# Patient Record
Sex: MALE | Race: BLACK OR AFRICAN AMERICAN | NOT HISPANIC OR LATINO | Employment: OTHER | URBAN - METROPOLITAN AREA
[De-identification: names, ages, dates, MRNs, and addresses within clinical notes are randomized per-mention and may not be internally consistent; named-entity substitution may affect disease eponyms.]

---

## 2017-04-30 ENCOUNTER — HOSPITAL ENCOUNTER (EMERGENCY)
Facility: OTHER | Age: 62
Discharge: ANOTHER HOSPITAL | End: 2017-04-30
Attending: EMERGENCY MEDICINE
Payer: COMMERCIAL

## 2017-04-30 VITALS
OXYGEN SATURATION: 96 % | BODY MASS INDEX: 28.44 KG/M2 | SYSTOLIC BLOOD PRESSURE: 131 MMHG | HEART RATE: 79 BPM | DIASTOLIC BLOOD PRESSURE: 75 MMHG | HEIGHT: 72 IN | WEIGHT: 210 LBS | RESPIRATION RATE: 18 BRPM | TEMPERATURE: 99 F

## 2017-04-30 DIAGNOSIS — I10 HYPERTENSION: ICD-10-CM

## 2017-04-30 DIAGNOSIS — K35.32 ACUTE APPENDICITIS WITH RUPTURE: Primary | ICD-10-CM

## 2017-04-30 LAB
ALBUMIN SERPL-MCNC: 3.8 G/DL (ref 3.3–5.5)
ALBUMIN SERPL-MCNC: 4.1 G/DL (ref 3.3–5.5)
ALP SERPL-CCNC: 61 U/L (ref 42–141)
ALP SERPL-CCNC: 62 U/L (ref 42–141)
BILIRUB SERPL-MCNC: 0.5 MG/DL (ref 0.2–1.6)
BILIRUB SERPL-MCNC: 0.5 MG/DL (ref 0.2–1.6)
BUN SERPL-MCNC: 10 MG/DL (ref 7–22)
CALCIUM SERPL-MCNC: 9.6 MG/DL (ref 8–10.3)
CHLORIDE SERPL-SCNC: 102 MMOL/L (ref 98–108)
CREAT SERPL-MCNC: 1 MG/DL (ref 0.6–1.2)
GLUCOSE SERPL-MCNC: 115 MG/DL (ref 73–118)
POC ALT (SGPT): 24 U/L (ref 10–47)
POC ALT (SGPT): 24 U/L (ref 10–47)
POC AMYLASE: 64 U/L (ref 14–97)
POC AST (SGOT): 28 U/L (ref 11–38)
POC AST (SGOT): 33 U/L (ref 11–38)
POC GGT: 22 U/L (ref 5–65)
POC PTINR: 1.1 (ref 0.9–1.2)
POC PTWBT: 12.6 SEC (ref 9.7–14.3)
POC TCO2: 28 MMOL/L (ref 18–33)
POTASSIUM BLD-SCNC: 3.8 MMOL/L (ref 3.6–5.1)
PROTEIN, POC: 7.6 G/DL (ref 6.4–8.1)
PROTEIN, POC: 7.7 G/DL (ref 6.4–8.1)
SAMPLE: NORMAL
SODIUM BLD-SCNC: 141 MMOL/L (ref 128–145)

## 2017-04-30 PROCEDURE — 93010 ELECTROCARDIOGRAM REPORT: CPT | Mod: ,,, | Performed by: INTERNAL MEDICINE

## 2017-04-30 PROCEDURE — 93005 ELECTROCARDIOGRAM TRACING: CPT

## 2017-04-30 PROCEDURE — 25500020 PHARM REV CODE 255: Performed by: EMERGENCY MEDICINE

## 2017-04-30 PROCEDURE — 96365 THER/PROPH/DIAG IV INF INIT: CPT

## 2017-04-30 PROCEDURE — 25000003 PHARM REV CODE 250: Performed by: EMERGENCY MEDICINE

## 2017-04-30 PROCEDURE — 99285 EMERGENCY DEPT VISIT HI MDM: CPT | Mod: 25

## 2017-04-30 PROCEDURE — 63600175 PHARM REV CODE 636 W HCPCS: Performed by: EMERGENCY MEDICINE

## 2017-04-30 PROCEDURE — 96375 TX/PRO/DX INJ NEW DRUG ADDON: CPT

## 2017-04-30 RX ORDER — SODIUM CHLORIDE 9 MG/ML
1000 INJECTION, SOLUTION INTRAVENOUS
Status: COMPLETED | OUTPATIENT
Start: 2017-04-30 | End: 2017-04-30

## 2017-04-30 RX ORDER — MORPHINE SULFATE 2 MG/ML
4 INJECTION, SOLUTION INTRAMUSCULAR; INTRAVENOUS
Status: COMPLETED | OUTPATIENT
Start: 2017-04-30 | End: 2017-04-30

## 2017-04-30 RX ORDER — HYDROMORPHONE HYDROCHLORIDE 2 MG/ML
0.5 INJECTION, SOLUTION INTRAMUSCULAR; INTRAVENOUS; SUBCUTANEOUS
Status: COMPLETED | OUTPATIENT
Start: 2017-04-30 | End: 2017-04-30

## 2017-04-30 RX ORDER — ONDANSETRON 2 MG/ML
4 INJECTION INTRAMUSCULAR; INTRAVENOUS
Status: COMPLETED | OUTPATIENT
Start: 2017-04-30 | End: 2017-04-30

## 2017-04-30 RX ADMIN — IOHEXOL 100 ML: 350 INJECTION, SOLUTION INTRAVENOUS at 09:04

## 2017-04-30 RX ADMIN — SODIUM CHLORIDE 1000 ML: 0.9 INJECTION, SOLUTION INTRAVENOUS at 11:04

## 2017-04-30 RX ADMIN — PIPERACILLIN SODIUM AND TAZOBACTAM SODIUM 4.5 G: 4; .5 INJECTION, POWDER, FOR SOLUTION INTRAVENOUS at 10:04

## 2017-04-30 RX ADMIN — ONDANSETRON 4 MG: 2 INJECTION INTRAMUSCULAR; INTRAVENOUS at 09:04

## 2017-04-30 RX ADMIN — MORPHINE SULFATE 4 MG: 2 INJECTION, SOLUTION INTRAMUSCULAR; INTRAVENOUS at 09:04

## 2017-04-30 RX ADMIN — HYDROMORPHONE HYDROCHLORIDE 0.5 MG: 2 INJECTION, SOLUTION INTRAMUSCULAR; INTRAVENOUS; SUBCUTANEOUS at 10:04

## 2017-05-01 NOTE — ED PROVIDER NOTES
Encounter Date: 4/30/2017       History     Chief Complaint   Patient presents with    Abdominal Pain     Review of patient's allergies indicates:  No Known Allergies  HPI Comments: Mr. joaquin was awakened yesterday morning with diffuse lower abdominal pain, more in the right lower quadrant.  Reports pain has worsened over the past 2 days.  He reports history of same one and a half months ago, had severe pain that lasted 6 or 7 days associated with abnormal bowel movements and it gradually improved and he felt better for about a month.  He does state however that his stool habits have not been as regular in the last month as they usually are.  Reports passing very little gas today, last bowel movement was yesterday.  No history of abdominal surgeries in the past.  He gets regular colonoscopies, last one was a few years ago    The history is provided by the patient.     Past Medical History:   Diagnosis Date    Diabetes mellitus     GERD (gastroesophageal reflux disease)     Hypertension      No past surgical history on file.  No family history on file.  Social History   Substance Use Topics    Smoking status: Not on file    Smokeless tobacco: Not on file    Alcohol use Not on file     Review of Systems   Constitutional: Positive for appetite change. Negative for fever.   Respiratory: Negative.    Cardiovascular: Negative.    Gastrointestinal: Positive for abdominal pain and constipation. Negative for diarrhea, nausea and vomiting.   Skin: Negative.    All other systems reviewed and are negative.      Physical Exam   Initial Vitals   BP Pulse Resp Temp SpO2   04/30/17 2032 04/30/17 2032 04/30/17 2032 04/30/17 2032 04/30/17 2032   153/85 80 18 98.4 °F (36.9 °C) 99 %     Physical Exam    Nursing note and vitals reviewed.  Constitutional: He appears well-developed and well-nourished. He is not diaphoretic. He appears distressed.   Uncomfortable.    HENT:   Head: Normocephalic.   Eyes: EOM are normal. Pupils are  equal, round, and reactive to light.   Neck: Normal range of motion. Neck supple.   Cardiovascular: Normal rate, regular rhythm and normal heart sounds. Exam reveals no friction rub.    No murmur heard.  Pulmonary/Chest: Breath sounds normal. No respiratory distress. He has no wheezes.   Abdominal: Soft. Bowel sounds are normal. He exhibits no distension and no mass. There is tenderness. There is rebound. There is no guarding.   Musculoskeletal: Normal range of motion. He exhibits no edema or tenderness.   Neurological: He is alert and oriented to person, place, and time. He has normal strength.   Skin: Skin is warm and dry. No rash noted. No erythema.   Psychiatric: He has a normal mood and affect. His behavior is normal. Judgment and thought content normal.         ED Course   Procedures  Labs Reviewed - No data to display          Medical Decision Making:   Clinical Tests:   Lab Tests: Ordered and Reviewed  The following lab test(s) were unremarkable: CBC, CMP and PT  Radiological Study: Ordered and Reviewed  ED Management:  Pt feel better until pain meds wear off. Discussed findings of appendicitis. Plan for transfer. [facility] first choice request is [facility]. Called transfer center, put in touch w Dr. Restrepo, accepted pt for transfer.               Imaging Results         CT Abdomen Pelvis With Contrast (Final result) Result time:  04/30/17 22:14:17    Final result by Interface, Rad Results In (04/30/17 22:14:17)    Narrative:    Study Desc:   CT ABDOMEN PELVIS WITH CONTRAST  Clinical History: Abdominal pain     TECHNIQUE: Axial CT images of the abdomen and pelvis were obtained from above the   diaphragm through the pubic symphisis following administration of intravenous contrast.  Contrast: 100 mL Omnipaque, IV  RADIATION DOSE: DLP= 561.97 mGy-cm: DLP means Dose Length Product, a radiation dose   metric that does not report the individual patient dose but is a reference value related   to the radiation output of  the scanner used for this exam.  COMPARISON: None available.  FINDINGS:  Liver: Normal in size and contour without focal liver lesions. The portal avein is patent.     Gallbladder: Normal appearing.     Biliary tree: No intrahepatic or extrahepatic biliary ductal dilatation.     Pancreas:  No focal abnormalities.  No peripancreatic fat infiltration or pancreatic duct   dilatation.     Spleen: Normal appearing.     Kidneys and ureters: Normal appearing.  Mild thickening enhancement of the distal right   ureter, likely secondary to inflammation in the right lower quadrant.     Adrenal glands: Normal appearing.     EG junction, stomach and duodenal sweep: Unremarkable.     Small bowel: Normal in caliber and wall thickness.     Large bowel: Mobile cecum in the right upper quadrant.  Diverticulosis, most pronounced   in the descending and sigmoid colon, without pericolonic fat infiltration.     Appendix: Acute appendicitis of the distal appendix/tip (maximum transverse dimension 2.1   cm), with irregularity and fluid surrounding the markedly thickened wall, which suggests   possible rupture (axial images 101-107).     Urinary bladder: Well-distended and thin-walled.     Retroperitoneal structures: Unremarkable.     Vessels:  Normal caliber abdominal aorta.  The IVC is unremarkable.     Lymph nodes:  No suspicious adenopathy     Free air: None.     Free fluid: None.     Osseous structures: No aggressive osseous lesions.  Transitional anatomy on the right at   S1.     IMPRESSION:  Appendicitis, with possible contained rupture.     SL: 24 Signed by: Jose Antonio Azul MD  2017-04-30 22:14:18 [CDT]            X-Ray Abdomen Flat And Erect (Final result) Result time:  04/30/17 21:33:59    Final result by [facility], Rad Results In (04/30/17 21:33:59)    Narrative:    Study Desc:   XR ABDOMEN FLAT AND ERECT  Clinical History: Abdominal pain     COMPARISON: NONE     FINDINGS:  The supine and upright views of the abdomen show a large  stool burden.  There are few   air-fluid levels in the upper abdomen.  There is no no appreciable abnormal dilatation of   bowel loops. There is no pneumatosis or mass effect.  There is no pneumoperitneum.     There are no radiopaque densities noted.     There are no clinically significant osseous abnormalities noted.     IMPRESSION:  Large stool burden with few air-fluid levels in the upper abdomen.  Correlate for   obstipation versus ileus.     SL:24 Signed by: Shaye Tyler M.D.  2017-04-30 21:33:54              30  Minutes of critical care.             ED Course     Clinical Impression:   The primary encounter diagnosis was Acute appendicitis with rupture. A diagnosis of Hypertension was also pertinent to this visit.          Janelle Duncan MD  05/01/17 0159       Janelle Duncan MD  05/01/17 0159

## 2017-06-13 ENCOUNTER — HOSPITAL ENCOUNTER (EMERGENCY)
Facility: HOSPITAL | Age: 62
Discharge: ANOTHER HOSPITAL | End: 2017-06-13
Attending: EMERGENCY MEDICINE
Payer: COMMERCIAL

## 2017-06-13 VITALS
TEMPERATURE: 98 F | HEART RATE: 90 BPM | SYSTOLIC BLOOD PRESSURE: 134 MMHG | WEIGHT: 205 LBS | BODY MASS INDEX: 27.77 KG/M2 | RESPIRATION RATE: 16 BRPM | DIASTOLIC BLOOD PRESSURE: 77 MMHG | OXYGEN SATURATION: 100 % | HEIGHT: 72 IN

## 2017-06-13 DIAGNOSIS — T58.91XA CARBON MONOXIDE POISONING, ACCIDENTAL OR UNINTENTIONAL, INITIAL ENCOUNTER: Primary | ICD-10-CM

## 2017-06-13 DIAGNOSIS — R06.02 SHORTNESS OF BREATH: ICD-10-CM

## 2017-06-13 LAB
ALBUMIN SERPL BCP-MCNC: 4 G/DL
ALLENS TEST: ABNORMAL
ALP SERPL-CCNC: 70 U/L
ALT SERPL W/O P-5'-P-CCNC: 18 U/L
ANION GAP SERPL CALC-SCNC: 11 MMOL/L
AST SERPL-CCNC: 22 U/L
BASOPHILS # BLD AUTO: 0.01 K/UL
BASOPHILS NFR BLD: 0.1 %
BILIRUB SERPL-MCNC: 0.2 MG/DL
BUN SERPL-MCNC: 14 MG/DL
CALCIUM SERPL-MCNC: 9.2 MG/DL
CHLORIDE SERPL-SCNC: 107 MMOL/L
CK SERPL-CCNC: 209 U/L
CO2 SERPL-SCNC: 21 MMOL/L
CREAT SERPL-MCNC: 1.1 MG/DL
DELSYS: ABNORMAL
DIFFERENTIAL METHOD: ABNORMAL
EOSINOPHIL # BLD AUTO: 0 K/UL
EOSINOPHIL NFR BLD: 0.2 %
ERYTHROCYTE [DISTWIDTH] IN BLOOD BY AUTOMATED COUNT: 14.1 %
EST. GFR  (AFRICAN AMERICAN): >60 ML/MIN/1.73 M^2
EST. GFR  (NON AFRICAN AMERICAN): >60 ML/MIN/1.73 M^2
GLUCOSE SERPL-MCNC: 128 MG/DL
HCO3 UR-SCNC: 25.5 MMOL/L (ref 24–28)
HCT VFR BLD AUTO: 38.2 %
HGB BLD-MCNC: 13.7 G/DL
INR PPP: 1
LACTATE SERPL-SCNC: 1.4 MMOL/L
LYMPHOCYTES # BLD AUTO: 1.7 K/UL
LYMPHOCYTES NFR BLD: 19.7 %
MCH RBC QN AUTO: 31.4 PG
MCHC RBC AUTO-ENTMCNC: 35.9 %
MCV RBC AUTO: 87 FL
MONOCYTES # BLD AUTO: 0.5 K/UL
MONOCYTES NFR BLD: 5.7 %
NEUTROPHILS # BLD AUTO: 6.5 K/UL
NEUTROPHILS NFR BLD: 74.3 %
PCO2 BLDA: 45.4 MMHG (ref 35–45)
PH SMN: 7.36 [PH] (ref 7.35–7.45)
PLATELET # BLD AUTO: 233 K/UL
PMV BLD AUTO: 10 FL
PO2 BLDA: 304 MMHG (ref 80–100)
POC BE: 0 MMOL/L
POC SATURATED O2: 100 % (ref 95–100)
POC TCO2: 27 MMOL/L (ref 23–27)
POCT GLUCOSE: 127 MG/DL (ref 70–110)
POTASSIUM SERPL-SCNC: 3.8 MMOL/L
PROT SERPL-MCNC: 7.5 G/DL
PROTHROMBIN TIME: 10.7 SEC
RBC # BLD AUTO: 4.37 M/UL
SAMPLE: ABNORMAL
SITE: ABNORMAL
SODIUM SERPL-SCNC: 139 MMOL/L
TROPONIN I SERPL DL<=0.01 NG/ML-MCNC: 0.01 NG/ML
WBC # BLD AUTO: 8.79 K/UL

## 2017-06-13 PROCEDURE — 82962 GLUCOSE BLOOD TEST: CPT

## 2017-06-13 PROCEDURE — 99900035 HC TECH TIME PER 15 MIN (STAT)

## 2017-06-13 PROCEDURE — 82375 ASSAY CARBOXYHB QUANT: CPT

## 2017-06-13 PROCEDURE — 83605 ASSAY OF LACTIC ACID: CPT

## 2017-06-13 PROCEDURE — 85025 COMPLETE CBC W/AUTO DIFF WBC: CPT

## 2017-06-13 PROCEDURE — 36600 WITHDRAWAL OF ARTERIAL BLOOD: CPT

## 2017-06-13 PROCEDURE — 85610 PROTHROMBIN TIME: CPT

## 2017-06-13 PROCEDURE — 93005 ELECTROCARDIOGRAM TRACING: CPT

## 2017-06-13 PROCEDURE — 84484 ASSAY OF TROPONIN QUANT: CPT

## 2017-06-13 PROCEDURE — 80053 COMPREHEN METABOLIC PANEL: CPT

## 2017-06-13 PROCEDURE — 99285 EMERGENCY DEPT VISIT HI MDM: CPT | Mod: 25

## 2017-06-13 PROCEDURE — 82550 ASSAY OF CK (CPK): CPT

## 2017-06-13 RX ORDER — LISINOPRIL 10 MG/1
10 TABLET ORAL DAILY
COMMUNITY

## 2017-06-13 RX ORDER — AMLODIPINE BESYLATE 5 MG/1
5 TABLET ORAL DAILY
COMMUNITY

## 2017-06-13 RX ORDER — METFORMIN HYDROCHLORIDE 500 MG/1
500 TABLET ORAL DAILY
COMMUNITY

## 2017-06-13 RX ORDER — ZOLPIDEM TARTRATE 10 MG/1
5 TABLET ORAL NIGHTLY PRN
COMMUNITY

## 2017-06-13 RX ORDER — ATORVASTATIN CALCIUM 40 MG/1
40 TABLET, FILM COATED ORAL DAILY
COMMUNITY

## 2017-06-13 NOTE — ED PROVIDER NOTES
"Encounter Date: 6/13/2017    SCRIBE #1 NOTE: I, Lauri Dey, am scribing for, and in the presence of,  Brooks Tucker MD. I have scribed the following portions of the note - Other sections scribed: HPI and ROS.       History     Chief Complaint   Patient presents with    Toxic Inhalation     Review of patient's allergies indicates:  No Known Allergies  CC: Toxic Inhalation    HPI: This 62 y.o M with borderline DM, GERD, anxiety and HTN presents to the ED via EMS for emergent evaluation of toxic inhalation secondary to accidentally leaving his car running in a closed garage during the [employer] finals for about 1.5 hours. The pt states he turned his '98 Camaro off at approximately 0100 and opened the garage door. The pt reports taking an Ambien yesterday PM. Per EMS, the pt was found "stumbling and confused." Pt denies SOB.              The history is provided by the patient. No  was used.     Past Medical History:   Diagnosis Date    Diabetes mellitus     GERD (gastroesophageal reflux disease)     Hypertension     S/P nasal surgery      Past Surgical History:   Procedure Laterality Date    APPENDECTOMY      NASAL SEPTOPLASTY W/ TURBINOPLASTY       History reviewed. No pertinent family history.  Social History   Substance Use Topics    Smoking status: Never Smoker    Smokeless tobacco: Not on file    Alcohol use No     Review of Systems   Unable to perform ROS: Acuity of condition   Respiratory: Negative for shortness of breath.    Psychiatric/Behavioral: Positive for confusion.       Physical Exam     Initial Vitals [06/13/17 0606]   BP Pulse Resp Temp SpO2   123/68 98 20 98 °F (36.7 °C) 99 %     Physical Exam    Nursing note and vitals reviewed.  Constitutional: He appears well-developed and well-nourished. He appears distressed.   HENT:   Head: Atraumatic.   Eyes: EOM are normal. Pupils are equal, round, and reactive to light.   Neck: Normal range of motion. Neck supple. No JVD " present.   Cardiovascular: Normal rate, regular rhythm, normal heart sounds and intact distal pulses. Exam reveals no gallop and no friction rub.    No murmur heard.  Pulmonary/Chest: Breath sounds normal. No respiratory distress. He has no wheezes. He has no rhonchi. He has no rales.   Abdominal: Soft. Bowel sounds are normal. There is no tenderness.   Musculoskeletal: Normal range of motion.   Lymphadenopathy:     He has no cervical adenopathy.   Neurological: He is alert.   GCS 14. Positive confusion. Gross fine motor skill abnormality with finger to nose. Ataxic gate per EMS. Mild labile mood.    Skin: Skin is warm and dry.   Psychiatric: He has a normal mood and affect. Thought content normal.         ED Course   Critical Care  Date/Time: 6/13/2017 7:03 AM  Performed by: VANNA CANO  Authorized by: VANNA CANO   Direct patient critical care time: 15 minutes  Additional history critical care time: 10 minutes  Ordering / reviewing critical care time: 5 minutes  Documentation critical care time: 10 minutes  Consulting other physicians critical care time: 5 minutes  Other critical care time: 10 (transfer) minutes  Total critical care time (exclusive of procedural time) : 55 minutes  Critical care time was exclusive of separately billable procedures and treating other patients and teaching time.  Critical care was necessary to treat or prevent imminent or life-threatening deterioration of the following conditions: CNS failure or compromise.  Critical care was time spent personally by me on the following activities: development of treatment plan with patient or surrogate, discussions with consultants, evaluation of patient's response to treatment, examination of patient, obtaining history from patient or surrogate, ordering and performing treatments and interventions, ordering and review of laboratory studies, ordering and review of radiographic studies, pulse oximetry, re-evaluation of patient's  condition and review of old charts.        Labs Reviewed   CBC W/ AUTO DIFFERENTIAL - Abnormal; Notable for the following:        Result Value    RBC 4.37 (*)     Hemoglobin 13.7 (*)     Hematocrit 38.2 (*)     MCH 31.4 (*)     Gran% 74.3 (*)     All other components within normal limits   COMPREHENSIVE METABOLIC PANEL - Abnormal; Notable for the following:     CO2 21 (*)     Glucose 128 (*)     All other components within normal limits   POCT GLUCOSE - Abnormal; Notable for the following:     POCT Glucose 127 (*)     All other components within normal limits   ISTAT PROCEDURE - Abnormal; Notable for the following:     POC PCO2 45.4 (*)     POC PO2 304 (*)     All other components within normal limits   PROTIME-INR   CARBON MONOXIDE, BLOOD   TROPONIN I   CK   LACTIC ACID, PLASMA   POCT GLUCOSE MONITORING CONTINUOUS     EKG Readings: (Independently Interpreted)   Initial Reading: No STEMI. Rhythm: Normal Sinus Rhythm. Heart Rate: 87. Ectopy: No Ectopy. Conduction: Normal. ST Segments: Normal ST Segments. T Waves: Normal.       X-Rays:   Independently Interpreted Readings:   Chest X-Ray: Normal heart size.  No infiltrates.  No acute abnormalities.      Patient presents after CO exposure. Found initially confused and ataxic by EMS. Arrived only on Nasal Canula. Immediately switched to % O2. CO level 40.4. Estimated out of the home for 45 min to one hour prior to CO level drawn. Wife was found unconscious in the home. Patient states he started his 97 Camaro some time during the [employer] [place] last night and ended up not leaving the house and accidentally left the car running. EMS states closed garage shared a wall with the master bedroom. Patient feels he turned the car off around 1 AM. EMS states the car was not running on arrival but felt the car was just recently turned off. Denies suicidal or homicidal intentions. Patient would benefit from hyperbarics. I immediately initiated transfer. Accepted by Dr. Lancaster  at [facility] at 0640.            Scribe Attestation:   Scribe #1: I performed the above scribed service and the documentation accurately describes the services I performed. I attest to the accuracy of the note.    Attending Attestation:           Physician Attestation for Scribe:  Physician Attestation Statement for Scribe #1: I, Brooks Tucker MD, reviewed documentation, as scribed by Lauri Dey in my presence, and it is both accurate and complete.                 ED Course     Clinical Impression:   The primary encounter diagnosis was Carbon monoxide poisoning, accidental or unintentional, initial encounter. A diagnosis of Shortness of breath was also pertinent to this visit.          Brooks Tucker MD  06/13/17 8440

## 2017-06-13 NOTE — ED TRIAGE NOTES
Pt brought to the er by ems from home. He called 911 after he found his spouse unresponsive after he left the car running for approx. 2 hours. He states the car was running in an enclosed garage.

## 2019-04-15 ENCOUNTER — HOSPITAL ENCOUNTER (EMERGENCY)
Facility: HOSPITAL | Age: 64
Discharge: HOME | End: 2019-04-15
Attending: EMERGENCY MEDICINE
Payer: COMMERCIAL

## 2019-04-15 VITALS
BODY MASS INDEX: 26.82 KG/M2 | RESPIRATION RATE: 20 BRPM | HEIGHT: 72 IN | OXYGEN SATURATION: 100 % | SYSTOLIC BLOOD PRESSURE: 128 MMHG | HEART RATE: 76 BPM | WEIGHT: 198 LBS | TEMPERATURE: 98 F | DIASTOLIC BLOOD PRESSURE: 72 MMHG

## 2019-04-15 DIAGNOSIS — M54.12 CERVICAL RADICULOPATHY: Primary | ICD-10-CM

## 2019-04-15 DIAGNOSIS — Z87.898 HISTORY OF CHEST PAIN: ICD-10-CM

## 2019-04-15 DIAGNOSIS — R52 PAIN: ICD-10-CM

## 2019-04-15 LAB — POCT GLUCOSE: 96 MG/DL (ref 70–110)

## 2019-04-15 PROCEDURE — 93010 EKG 12-LEAD: ICD-10-PCS | Mod: ,,, | Performed by: INTERNAL MEDICINE

## 2019-04-15 PROCEDURE — 93010 ELECTROCARDIOGRAM REPORT: CPT | Mod: ,,, | Performed by: INTERNAL MEDICINE

## 2019-04-15 PROCEDURE — 63600175 PHARM REV CODE 636 W HCPCS: Mod: ER | Performed by: NURSE PRACTITIONER

## 2019-04-15 PROCEDURE — 93005 ELECTROCARDIOGRAM TRACING: CPT | Mod: ER

## 2019-04-15 PROCEDURE — 96372 THER/PROPH/DIAG INJ SC/IM: CPT | Mod: ER

## 2019-04-15 PROCEDURE — 99284 EMERGENCY DEPT VISIT MOD MDM: CPT | Mod: 25,ER

## 2019-04-15 RX ORDER — DICLOFENAC SODIUM 25 MG/1
25 TABLET, DELAYED RELEASE ORAL EVERY 8 HOURS PRN
Qty: 20 TABLET | Refills: 0 | Status: SHIPPED | OUTPATIENT
Start: 2019-04-15

## 2019-04-15 RX ORDER — KETOROLAC TROMETHAMINE 30 MG/ML
15 INJECTION, SOLUTION INTRAMUSCULAR; INTRAVENOUS
Status: COMPLETED | OUTPATIENT
Start: 2019-04-15 | End: 2019-04-15

## 2019-04-15 RX ORDER — METHOCARBAMOL 750 MG/1
750 TABLET, FILM COATED ORAL EVERY 8 HOURS PRN
Qty: 20 TABLET | Refills: 0 | Status: SHIPPED | OUTPATIENT
Start: 2019-04-15

## 2019-04-15 RX ADMIN — KETOROLAC TROMETHAMINE 15 MG: 30 INJECTION, SOLUTION INTRAMUSCULAR at 10:04

## 2019-04-15 NOTE — ED PROVIDER NOTES
Encounter Date: 4/15/2019       History     Chief Complaint   Patient presents with    Arm Problem     The history is provided by the patient. No  was used.   General Illness    Illness onset: Patient presents ambulatory complaining of having a single episode of right-sided chest pain 2 weeks ago that lasted about an hour.  He since went to his cardiologist to cleared him from any acute cardiac injury.  Progression since onset: Patient reports for the past 13 days he has been having right arm pain.  Saw his orthopedic doctor who diagnosed him with a probable pinched nerve and has ordered physical therapy.  Patient also saw his PCP and is currently taking gabapentin . The pain is at a severity of 7/10. Pertinent negatives include no fever, no abdominal pain, no diarrhea, no nausea, no vomiting, no congestion, no sore throat, no neck pain, no cough, no rash, no discharge and no pain. Recently, medical care has been given by the PCP and by a specialist (Patient reports he is scheduled for physical therapy tomorrow.  Patient reports his orthopedic gave him a steroid injection in the ER.  Patient reports the gabapentin his primary care provider has him on is not affect).     Review of patient's allergies indicates:  No Known Allergies  Past Medical History:   Diagnosis Date    Diabetes mellitus     GERD (gastroesophageal reflux disease)     Hypertension     S/P nasal surgery      Past Surgical History:   Procedure Laterality Date    APPENDECTOMY      NASAL SEPTOPLASTY W/ TURBINOPLASTY       History reviewed. No pertinent family history.  Social History     Tobacco Use    Smoking status: Never Smoker   Substance Use Topics    Alcohol use: No    Drug use: Not on file     Review of Systems   Constitutional: Negative.  Negative for chills and fever.   HENT: Negative.  Negative for congestion, sinus pressure and sore throat.    Eyes: Negative.  Negative for pain and discharge.   Respiratory:  Negative.  Negative for cough.    Cardiovascular: Negative.  Negative for chest pain.   Gastrointestinal: Negative.  Negative for abdominal pain, diarrhea, nausea and vomiting.   Genitourinary: Negative.  Negative for dysuria, genital sores, penile pain, scrotal swelling and testicular pain.   Musculoskeletal: Negative for gait problem, joint swelling and neck pain.        Right shoulder pain, right forearm pain and numbness to the right 4th and 5th finger   Skin: Negative.  Negative for color change and rash.   Allergic/Immunologic: Negative.    Neurological: Negative.    Psychiatric/Behavioral: Negative.  Negative for behavioral problems, self-injury and suicidal ideas. The patient is not hyperactive.    All other systems reviewed and are negative.      Physical Exam     Initial Vitals [04/15/19 0959]   BP Pulse Resp Temp SpO2   (!) 172/93 72 18 98.2 °F (36.8 °C) 99 %      MAP       --         Physical Exam    Nursing note and vitals reviewed.  Constitutional: He appears well-developed and well-nourished. He is not diaphoretic.  Non-toxic appearance. He does not appear ill. No distress.   HENT:   Head: Normocephalic and atraumatic.   Eyes: Conjunctivae are normal.   Neck: Normal range of motion.   Cardiovascular: Normal rate, regular rhythm, normal heart sounds and intact distal pulses. Exam reveals no gallop and no friction rub.    No murmur heard.  Pulmonary/Chest: Breath sounds normal. No respiratory distress. He has no wheezes. He has no rhonchi. He has no rales. He exhibits no tenderness.   Musculoskeletal: Normal range of motion.   5/5 motor strength BUE with intact sensation  Negative Rodriguez's BUE  2+ reflexes BUE  No bony tenderness  No s/s cauda equina   Neurological: He is alert and oriented to person, place, and time. He has normal strength. He is not disoriented. He displays no atrophy and no tremor. No cranial nerve deficit or sensory deficit. He exhibits normal muscle tone. He displays a negative  Romberg sign. He displays no seizure activity. Coordination and gait normal. GCS eye subscore is 4. GCS verbal subscore is 5. GCS motor subscore is 6.   Negative ataxic gait   Skin: Skin is warm and dry. No rash noted.   Psychiatric: He has a normal mood and affect. His behavior is normal. Judgment and thought content normal.         ED Course   Procedures  Labs Reviewed   POCT GLUCOSE, HAND-HELD DEVICE   POCT GLUCOSE     EKG Readings: (Independently Interpreted)   Initial Reading: No STEMI. Rhythm: Normal Sinus Rhythm. Heart Rate: 75. Ectopy: No Ectopy. Conduction: Normal. ST Segments: Normal ST Segments. T Waves: Normal. Axis: Normal. Clinical Impression: Normal Sinus Rhythm       Imaging Results    None          Medical Decision Making:   Initial Assessment:   Cervical radiculopathy  Differential Diagnosis:   Cardiac abnormality  Clinical Tests:   Lab Tests: Ordered and Reviewed       <> Summary of Lab: Glucose 96  ED Management:  Toradol IM injection given in the ER.  The patient will be discharged home on diclofenac and Robaxin with instructions to follow up with his orthopedic as scheduled and continue physical therapy as ordered, as well as follow up with his primary care provider for follow-up of pain management as the gabapentin that was prescribed is not effective.  Patient is also instructed to return to the ER as needed if symptoms worsen or fail to improve.  The patient verbalized understanding of discharge instructions and treatment plan.                      Clinical Impression:       ICD-10-CM ICD-9-CM   1. Cervical radiculopathy M54.12 723.4   2. Pain R52 780.96   3. History of chest pain Z87.898 V13.89                                Toussaint Battley III, [facility]  04/15/19 2235

## 2019-04-15 NOTE — ED PROVIDER NOTES
"Encounter Date: 4/15/2019    SCRIBE #1 NOTE: I, Quintin Landis, am scribing for, and in the presence of,  Toussaint Battley, FNP. I have scribed the following portions of the note - Other sections scribed: HPI, ROS, PE.       History     Chief Complaint   Patient presents with    Arm Problem     CC:   Right Shoulder Pain and Right Forearm Pain  HPI:  This is a 63 y.o. male who presents to the ED with a chief complaint of right shoulder pain and right forearm pain with numbness to his fifth and fourth fingers that began thirteen days ago.  Pt states that he feels these areas of pain are isolated.  Pt describes the quality of his pain as a "firecracker popping".  Turning his neck worsens his shoulder pain. He has had no previous problems with his neck.  Pt states that he was seen by his PCP's NP and was prescribed gabapentin that did not provide relief of his pain.  Pt saw his orthopedic doctor last week and was told that it was a pinched nerve and referred to physical therapy and thinks that he was given a steroid shot but is unsure.  Pt also notes that he had a single episode of right sided CP two weeks ago.  He saw Dr. Vega one week ago and had a cardiac workup that came back normal.    The history is provided by the patient.     Review of patient's allergies indicates:  No Known Allergies  Past Medical History:   Diagnosis Date    Diabetes mellitus     GERD (gastroesophageal reflux disease)     Hypertension     S/P nasal surgery      Past Surgical History:   Procedure Laterality Date    APPENDECTOMY      NASAL SEPTOPLASTY W/ TURBINOPLASTY       History reviewed. No pertinent family history.  Social History     Tobacco Use    Smoking status: Never Smoker   Substance Use Topics    Alcohol use: No    Drug use: Not on file     Review of Systems   Constitutional: Negative for fever.   Respiratory: Negative for shortness of breath.    Cardiovascular: Positive for chest pain (Resolved). Negative for " palpitations and leg swelling.   Gastrointestinal: Negative for abdominal pain, diarrhea, nausea and vomiting.   Musculoskeletal: Negative for back pain, neck pain and neck stiffness.        Positive for right forearm pain and right shoulder pain.    Neurological: Positive for numbness. Negative for speech difficulty, weakness and headaches.   All other systems reviewed and are negative.      Physical Exam     Initial Vitals [04/15/19 0959]   BP Pulse Resp Temp SpO2   (!) 172/93 72 18 98.2 °F (36.8 °C) 99 %      MAP       --         Physical Exam    Nursing note and vitals reviewed.  Constitutional: He appears well-developed and well-nourished.   HENT:   Head: Normocephalic and atraumatic.   Eyes: Conjunctivae are normal.   Neck: Normal range of motion and phonation normal. Neck supple. No spinous process tenderness and no muscular tenderness present. No edema, no erythema and normal range of motion present. No neck rigidity.   Cardiovascular: Normal rate, regular rhythm, normal heart sounds and intact distal pulses. Exam reveals no gallop and no friction rub.    No murmur heard.  Pulses:       Radial pulses are 2+ on the right side.   Pulmonary/Chest: Breath sounds normal. No respiratory distress. He has no wheezes. He has no rhonchi. He has no rales. He exhibits no tenderness.   Musculoskeletal: Normal range of motion.        Right shoulder: He exhibits normal range of motion, no tenderness, no bony tenderness, no swelling, no effusion, no crepitus, no deformity, no laceration, no pain, no spasm, normal pulse and normal strength.        Cervical back: He exhibits normal range of motion, no tenderness, no bony tenderness, no swelling, no edema, no deformity, no laceration, no pain, no spasm and normal pulse.        Right upper arm: He exhibits no tenderness, no bony tenderness, no swelling, no edema, no deformity and no laceration.        Right forearm: He exhibits no tenderness, no bony tenderness, no swelling, no  edema, no deformity and no laceration.        Right hand: He exhibits normal range of motion, no tenderness, no bony tenderness, normal two-point discrimination, normal capillary refill, no deformity, no laceration and no swelling. Normal sensation noted.   Neurological: He is alert and oriented to person, place, and time. He has normal strength. No sensory deficit. GCS score is 15. GCS eye subscore is 4. GCS verbal subscore is 5. GCS motor subscore is 6.   Skin: Skin is warm and dry. Capillary refill takes less than 2 seconds. No rash noted.   Psychiatric: He has a normal mood and affect. His behavior is normal.         ED Course   Procedures  Labs Reviewed   POCT GLUCOSE, HAND-HELD DEVICE   POCT GLUCOSE          Imaging Results          X-Ray Chest PA And Lateral (In process)                  Medical Decision Making:   Clinical Tests:   Lab Tests: Ordered  Medical Tests: Ordered            Scribe Attestation:   Scribe #1: I performed the above scribed service and the documentation accurately describes the services I performed. I attest to the accuracy of the note.    ***           Clinical Impression:     1. Pain    2. History of chest pain

## 2019-05-20 ENCOUNTER — HOSPITAL ENCOUNTER (EMERGENCY)
Facility: HOSPITAL | Age: 64
Discharge: HOME | End: 2019-05-20
Attending: EMERGENCY MEDICINE
Payer: COMMERCIAL

## 2019-05-20 VITALS
HEIGHT: 72 IN | WEIGHT: 198 LBS | DIASTOLIC BLOOD PRESSURE: 68 MMHG | BODY MASS INDEX: 26.82 KG/M2 | OXYGEN SATURATION: 97 % | SYSTOLIC BLOOD PRESSURE: 130 MMHG | RESPIRATION RATE: 17 BRPM | HEART RATE: 82 BPM | TEMPERATURE: 98 F

## 2019-05-20 DIAGNOSIS — K59.00 CONSTIPATION: ICD-10-CM

## 2019-05-20 PROCEDURE — 99283 EMERGENCY DEPT VISIT LOW MDM: CPT | Mod: ER

## 2019-05-20 RX ORDER — METOCLOPRAMIDE 10 MG/1
10 TABLET ORAL EVERY 6 HOURS PRN
Qty: 30 TABLET | Refills: 0 | Status: SHIPPED | OUTPATIENT
Start: 2019-05-20

## 2019-05-21 NOTE — ED NOTES
Pt actively hyperventilating upon nurse entering the room. Nurse assists pt with breathing techniques and visual redirection. Pt's breathing improves. Pt and family member at the bedside advise the pt does have panic attacks which aren't controlled well.

## 2019-05-21 NOTE — ED PROVIDER NOTES
Encounter Date: 5/20/2019    SCRIBE #1 NOTE: I, Marco A Hutchins, am scribing for, and in the presence of,  Dr. Henry. I have scribed the following portions of the note - Other sections scribed: HPI, ROS, PE.       History     Chief Complaint   Patient presents with    MULTIPLE COMPLAINTS     PT REPORTS HAD LEFT HIP REPLACEMENT ON TUESDAY AND WAS DISCHARGED THURSDAY, C/O ABD PAIN, NAUSEA, LEG NUMBNESS, PT DENIES TAKING STOOL SOFTENER WITH HIS OXYCODONE     Ariel Cabrera is a 63 y.o. male who presents to the ED complaining of constipation for 4 days and shortness of breath. He had a hip replacement and was on narcotics. He has not been taking stool softeners despite being prescribed. He also gets hot and has tingling legs.    The history is provided by the patient. No  was used.     Review of patient's allergies indicates:  No Known Allergies  Past Medical History:   Diagnosis Date    Diabetes mellitus     GERD (gastroesophageal reflux disease)     Hypertension     S/P nasal surgery      Past Surgical History:   Procedure Laterality Date    APPENDECTOMY      HIP REPLACEMENT ARTHROPLASTY      NASAL SEPTOPLASTY W/ TURBINOPLASTY       No family history on file.  Social History     Tobacco Use    Smoking status: Never Smoker   Substance Use Topics    Alcohol use: No    Drug use: Not on file     Review of Systems   Constitutional: Positive for fever.   HENT: Negative.  Negative for sore throat.    Eyes: Negative.  Negative for pain.   Respiratory: Positive for shortness of breath.    Cardiovascular: Negative.  Negative for chest pain.   Gastrointestinal: Positive for constipation. Negative for abdominal pain and vomiting.   Genitourinary: Negative.  Negative for dysuria.   Musculoskeletal: Negative.  Negative for back pain.   Skin: Negative.  Negative for rash.   Neurological: Positive for numbness. Negative for headaches.   Hematological: Negative.    Psychiatric/Behavioral: Negative.     All other systems reviewed and are negative.      Physical Exam     Initial Vitals [05/20/19 2223]   BP Pulse Resp Temp SpO2   138/72 87 (!) 24 98.4 °F (36.9 °C) 100 %      MAP       --         Physical Exam    Nursing note and vitals reviewed.  Constitutional: He appears well-developed and well-nourished.   HENT:   Head: Normocephalic and atraumatic.   Eyes: Conjunctivae and EOM are normal.   Neck: Normal range of motion. Neck supple.   Cardiovascular: Normal rate and intact distal pulses.   Pulmonary/Chest: Effort normal. No respiratory distress.   Abdominal: Soft. He exhibits no distension and no mass. There is no tenderness. There is no rebound and no guarding.   Musculoskeletal: Normal range of motion.   Neurological: He is alert and oriented to person, place, and time.   Skin: Skin is warm and dry.   Psychiatric: He has a normal mood and affect. His behavior is normal.         ED Course   Procedures  Labs Reviewed - No data to display       Imaging Results          X-Ray Abdomen Flat And Erect (In process)  Result time 05/20/19 23:26:14                 Medical Decision Making:   ED Management:  X-ray per my interpretation shows a moderate to heavy stool burden consistent with the patient's constipation.            Scribe Attestation:   Scribe #1: I performed the above scribed service and the documentation accurately describes the services I performed. I attest to the accuracy of the note.    This document was produced by a scribe under my direction and in my presence. I agree with the content of the note and have made any necessary edits.     Giovanny Henry MD    05/20/2019 11:27 PM           Clinical Impression:     1. Constipation                                   Giovanny Henry MD  05/20/19 7980

## 2019-05-21 NOTE — DISCHARGE INSTRUCTIONS
MiraLax, Colace, fiber and stool softener.  Reglan as directed.  Fleet's are mineral oil enema if constipation is unresponsive.

## 2019-05-21 NOTE — ED NOTES
Both pt and spouse educated on constipation. Pt has been taking Metamucil without drinking water. Nurse educates both on the mechanism of action with Metamucil and the required water consumption. Also, educated both on the necessity to have some form of stool softener while taking narcotics. Spouse verbalizes understanding and no new questions presented at present time.

## 2021-04-15 ENCOUNTER — PATIENT MESSAGE (OUTPATIENT)
Dept: RESEARCH | Facility: HOSPITAL | Age: 66
End: 2021-04-15